# Patient Record
Sex: FEMALE | Race: OTHER | URBAN - METROPOLITAN AREA
[De-identification: names, ages, dates, MRNs, and addresses within clinical notes are randomized per-mention and may not be internally consistent; named-entity substitution may affect disease eponyms.]

---

## 2017-12-10 ENCOUNTER — EMERGENCY (EMERGENCY)
Facility: HOSPITAL | Age: 22
LOS: 0 days | Discharge: HOME | End: 2017-12-10
Attending: EMERGENCY MEDICINE
Payer: SELF-PAY

## 2017-12-10 VITALS
SYSTOLIC BLOOD PRESSURE: 134 MMHG | HEART RATE: 109 BPM | TEMPERATURE: 98 F | WEIGHT: 221.12 LBS | DIASTOLIC BLOOD PRESSURE: 84 MMHG | RESPIRATION RATE: 16 BRPM | HEIGHT: 64 IN | OXYGEN SATURATION: 99 %

## 2017-12-10 PROCEDURE — 99283 EMERGENCY DEPT VISIT LOW MDM: CPT

## 2017-12-10 RX ORDER — IBUPROFEN 200 MG
1 TABLET ORAL
Qty: 20 | Refills: 0 | OUTPATIENT
Start: 2017-12-10 | End: 2017-12-14

## 2017-12-10 RX ORDER — IBUPROFEN 200 MG
600 TABLET ORAL ONCE
Qty: 0 | Refills: 0 | Status: COMPLETED | OUTPATIENT
Start: 2017-12-10 | End: 2017-12-10

## 2017-12-10 RX ORDER — AZITHROMYCIN 500 MG/1
1 TABLET, FILM COATED ORAL
Qty: 5 | Refills: 0 | OUTPATIENT
Start: 2017-12-10 | End: 2017-12-14

## 2017-12-10 RX ORDER — AZITHROMYCIN 500 MG/1
500 TABLET, FILM COATED ORAL ONCE
Qty: 0 | Refills: 0 | Status: COMPLETED | OUTPATIENT
Start: 2017-12-10 | End: 2017-12-10

## 2017-12-10 RX ADMIN — Medication 600 MILLIGRAM(S): at 18:11

## 2017-12-10 RX ADMIN — AZITHROMYCIN 500 MILLIGRAM(S): 500 TABLET, FILM COATED ORAL at 18:11

## 2017-12-10 NOTE — ED PROVIDER NOTE - OBJECTIVE STATEMENT
23 yo F with sore throat for 2 days, difficulty swallowing.  Fever at home+.  Pt. noticed pus on her tonsils when she looked in the mirror.  No sick contacts, no other complaints.   ROS: negative for cough, headache, chest pain, shortness of breath, abd pain, nausea, vomiting, diarrhea, rash, paresthesia, and weakness.   PMH: negative; Meds: Denies; SH: Denies smoking/drinking/drug use

## 2017-12-10 NOTE — ED PROVIDER NOTE - PROGRESS NOTE DETAILS
pt. has bacterial pharyngitis   Pt. reports feeling better after meds  pt. agrees to f/u with primary care outpt.  pt. understands to return to ED if symptoms worsen; will d/c with antbx and motrin

## 2017-12-10 NOTE — ED PROVIDER NOTE - MEDICAL DECISION MAKING DETAILS
23 yo F with bacterial pharyngitis, pen allergic  -500 mg azithro x5 days, motrin, reeval and d/c with pcp f/u  -return instructions discussed including trouble breathing or inability to swallow

## 2017-12-11 DIAGNOSIS — J02.9 ACUTE PHARYNGITIS, UNSPECIFIED: ICD-10-CM

## 2017-12-11 DIAGNOSIS — R13.10 DYSPHAGIA, UNSPECIFIED: ICD-10-CM

## 2017-12-11 DIAGNOSIS — J02.8 ACUTE PHARYNGITIS DUE TO OTHER SPECIFIED ORGANISMS: ICD-10-CM

## 2017-12-11 DIAGNOSIS — Z88.0 ALLERGY STATUS TO PENICILLIN: ICD-10-CM

## 2018-01-01 NOTE — ED ADULT NURSE NOTE - INTEGUMENTARY WDL
Color consistent with ethnicity/race, warm, dry intact, resilient.
hard copy, drawn during this pregnancy